# Patient Record
Sex: FEMALE | Race: OTHER | HISPANIC OR LATINO
[De-identification: names, ages, dates, MRNs, and addresses within clinical notes are randomized per-mention and may not be internally consistent; named-entity substitution may affect disease eponyms.]

---

## 2018-06-12 ENCOUNTER — RESULT REVIEW (OUTPATIENT)
Age: 38
End: 2018-06-12

## 2019-06-27 ENCOUNTER — RESULT REVIEW (OUTPATIENT)
Age: 39
End: 2019-06-27

## 2019-07-27 ENCOUNTER — RESULT REVIEW (OUTPATIENT)
Age: 39
End: 2019-07-27

## 2019-09-13 PROBLEM — Z00.00 ENCOUNTER FOR PREVENTIVE HEALTH EXAMINATION: Status: ACTIVE | Noted: 2019-09-13

## 2019-09-18 ENCOUNTER — APPOINTMENT (OUTPATIENT)
Dept: OBGYN | Facility: CLINIC | Age: 39
End: 2019-09-18
Payer: MEDICAID

## 2019-09-18 ENCOUNTER — OUTPATIENT (OUTPATIENT)
Dept: OUTPATIENT SERVICES | Facility: HOSPITAL | Age: 39
LOS: 1 days | End: 2019-09-18
Payer: MEDICAID

## 2019-09-18 VITALS
HEART RATE: 80 BPM | TEMPERATURE: 98 F | DIASTOLIC BLOOD PRESSURE: 79 MMHG | HEIGHT: 62 IN | SYSTOLIC BLOOD PRESSURE: 113 MMHG | WEIGHT: 196 LBS | BODY MASS INDEX: 36.07 KG/M2

## 2019-09-18 DIAGNOSIS — Z31.9 ENCOUNTER FOR PROCREATIVE MANAGEMENT, UNSPECIFIED: ICD-10-CM

## 2019-09-18 DIAGNOSIS — Z00.00 ENCOUNTER FOR GENERAL ADULT MEDICAL EXAMINATION WITHOUT ABNORMAL FINDINGS: ICD-10-CM

## 2019-09-18 PROCEDURE — G0463: CPT

## 2019-09-18 PROCEDURE — 99203 OFFICE O/P NEW LOW 30 MIN: CPT | Mod: NC

## 2019-09-18 NOTE — CHIEF COMPLAINT
[Follow Up] : follow up GYN visit [FreeTextEntry1] : pt's here for consult to untie her tubes. Last pap 05/2019

## 2019-09-18 NOTE — HISTORY OF PRESENT ILLNESS
[Definite:  ___ (Date)] : the last menstrual period was [unfilled] [Sexually Active] : is sexually active [Male ___] : [unfilled] male

## 2019-09-19 DIAGNOSIS — Z31.69 ENCOUNTER FOR OTHER GENERAL COUNSELING AND ADVICE ON PROCREATION: ICD-10-CM

## 2020-01-08 ENCOUNTER — APPOINTMENT (OUTPATIENT)
Dept: SURGERY | Facility: CLINIC | Age: 40
End: 2020-01-08
Payer: MEDICAID

## 2020-01-08 VITALS
SYSTOLIC BLOOD PRESSURE: 119 MMHG | BODY MASS INDEX: 36.63 KG/M2 | HEART RATE: 89 BPM | TEMPERATURE: 98.6 F | WEIGHT: 194 LBS | OXYGEN SATURATION: 98 % | DIASTOLIC BLOOD PRESSURE: 82 MMHG | HEIGHT: 61 IN

## 2020-01-08 DIAGNOSIS — E66.01 MORBID (SEVERE) OBESITY DUE TO EXCESS CALORIES: ICD-10-CM

## 2020-01-08 DIAGNOSIS — Z78.9 OTHER SPECIFIED HEALTH STATUS: ICD-10-CM

## 2020-01-08 DIAGNOSIS — K21.9 GASTRO-ESOPHAGEAL REFLUX DISEASE W/OUT ESOPHAGITIS: ICD-10-CM

## 2020-01-08 PROCEDURE — 99202 OFFICE O/P NEW SF 15 MIN: CPT

## 2020-01-08 RX ORDER — CHROMIUM 200 MCG
TABLET ORAL
Refills: 0 | Status: ACTIVE | COMMUNITY

## 2020-01-08 RX ORDER — PANTOPRAZOLE 40 MG/1
TABLET, DELAYED RELEASE ORAL
Refills: 0 | Status: ACTIVE | COMMUNITY

## 2020-01-08 RX ORDER — FAMOTIDINE 10 MG/1
TABLET, FILM COATED ORAL
Refills: 0 | Status: ACTIVE | COMMUNITY

## 2020-01-08 RX ORDER — ASCORBIC ACID 500 MG
TABLET ORAL
Refills: 0 | Status: ACTIVE | COMMUNITY

## 2020-01-08 NOTE — PLAN
[FreeTextEntry1] : Ms. BENDER will follow up  if needed. \par consult Nutritionist\par increase activity and exercise  \par Consult with pulmonologist for possible sleep study. \par Her questions and concerns were addressed to her satisfaction. \par \par

## 2020-01-08 NOTE — REVIEW OF SYSTEMS
[Reflux/Heartburn] : reflux/heartburn [Negative] : Allergic/Immunologic [Patient Intake Form Reviewed] : Patient intake form was reviewed

## 2020-01-08 NOTE — PHYSICAL EXAM
[Obese, well nourished, in no acute distress] : obese, well nourished, in no acute distress [Normal] : affect appropriate [de-identified] : Obese, protuberant. Soft, nontender, nondistended, no rebound or guarding.

## 2020-01-08 NOTE — HISTORY OF PRESENT ILLNESS
[de-identified] : Ms. JOHANNA BENDER  presents today  for bariatric surgery consultation. she has a long-standing history of severe obesity refractory to multiple prior attempts at weight loss including conservative treatments of diet and exercise.  Ms. JOHANNA BENDER has been obese for last 10-15 years , and the most weight that she  has been able to lose by any means is negligible.   Previous weight loss efforts include: Calorie-counting, restricted intake. She was recently prescribed phentermine, but is reluctant to try it due to side effects.  Ms. BENDER has limited capacity for exercise due to excess weight.  she  feels that weight loss surgery is the only option at this point for effective and clinically meaningful weight loss.

## 2020-01-08 NOTE — ASSESSMENT
[FreeTextEntry1] : \par Ms. JOHANNA BENDER   has failed weight loss by non-operative means. PAtient does not meet criteria for weight loss surgery, based on current BMI 36.6 without  obesity-related comorbidities. She states that she has severe snoring but has never been tested for obstructive sleep apnea. \par \par Risks, benefits, and alternatives to bariatric surgery were fully discussed.  I reviewed importance of behavioral modification. Nutrition was  reviewed and reinforced, including the importance  of making healthy food choices. The importance of maintaining regular exercise/physical activity also reinforced. Recommend patient to see nutritionist. Patient's questions and concerns addressed to patient's satisfaction.